# Patient Record
Sex: FEMALE | Employment: FULL TIME | URBAN - METROPOLITAN AREA
[De-identification: names, ages, dates, MRNs, and addresses within clinical notes are randomized per-mention and may not be internally consistent; named-entity substitution may affect disease eponyms.]

---

## 2020-08-05 ENCOUNTER — HOSPITAL ENCOUNTER (OUTPATIENT)
Dept: CT IMAGING | Age: 70
Discharge: HOME | End: 2020-08-05
Attending: UROLOGY
Payer: MEDICARE

## 2020-08-05 DIAGNOSIS — R31.29 MICROSCOPIC HEMATURIA: ICD-10-CM

## 2020-08-05 LAB — CREAT UR-MCNC: 0.7 MG/DL (ref 0.6–1.3)

## 2020-08-05 PROCEDURE — 82565 ASSAY OF CREATININE: CPT

## 2020-08-05 PROCEDURE — 74178 CT ABD&PLV WO CNTR FLWD CNTR: CPT

## 2020-08-05 PROCEDURE — 74011636320 HC RX REV CODE- 636/320: Performed by: UROLOGY

## 2020-08-05 RX ADMIN — IOPAMIDOL 100 ML: 755 INJECTION, SOLUTION INTRAVENOUS at 08:12

## 2020-08-05 NOTE — PROGRESS NOTES
Please send patient a copy of the report. Please call and let her know her CT scan was normal.     Midny Canada MD  [facility], [facility]

## 2020-10-03 PROBLEM — H25.012 CORTICAL AGE-RELATED CATARACT OF LEFT EYE: Status: ACTIVE | Noted: 2020-10-03

## 2020-10-05 PROBLEM — H25.012 CORTICAL AGE-RELATED CATARACT OF LEFT EYE: Status: RESOLVED | Noted: 2020-10-03 | Resolved: 2020-10-05

## 2020-10-18 PROBLEM — H25.011 CORTICAL AGE-RELATED CATARACT OF RIGHT EYE: Status: ACTIVE | Noted: 2020-10-18

## 2020-10-19 PROBLEM — H25.011 CORTICAL AGE-RELATED CATARACT OF RIGHT EYE: Status: RESOLVED | Noted: 2020-10-18 | Resolved: 2020-10-19

## 2020-11-12 ENCOUNTER — OFFICE VISIT (OUTPATIENT)
Dept: ORTHOPEDIC SURGERY | Age: 70
End: 2020-11-12
Payer: MEDICARE

## 2020-11-12 VITALS
HEIGHT: 63 IN | SYSTOLIC BLOOD PRESSURE: 101 MMHG | OXYGEN SATURATION: 96 % | TEMPERATURE: 96.9 F | BODY MASS INDEX: 38.13 KG/M2 | DIASTOLIC BLOOD PRESSURE: 61 MMHG | HEART RATE: 71 BPM | WEIGHT: 215.2 LBS

## 2020-11-12 DIAGNOSIS — M25.561 CHRONIC PAIN OF RIGHT KNEE: Primary | ICD-10-CM

## 2020-11-12 DIAGNOSIS — E66.01 SEVERE OBESITY (HCC): ICD-10-CM

## 2020-11-12 DIAGNOSIS — G89.29 CHRONIC PAIN OF RIGHT KNEE: Primary | ICD-10-CM

## 2020-11-12 PROCEDURE — G8419 CALC BMI OUT NRM PARAM NOF/U: HCPCS | Performed by: ORTHOPAEDIC SURGERY

## 2020-11-12 PROCEDURE — G8427 DOCREV CUR MEDS BY ELIG CLIN: HCPCS | Performed by: ORTHOPAEDIC SURGERY

## 2020-11-12 PROCEDURE — G9899 SCRN MAM PERF RSLTS DOC: HCPCS | Performed by: ORTHOPAEDIC SURGERY

## 2020-11-12 PROCEDURE — G8510 SCR DEP NEG, NO PLAN REQD: HCPCS | Performed by: ORTHOPAEDIC SURGERY

## 2020-11-12 PROCEDURE — G8536 NO DOC ELDER MAL SCRN: HCPCS | Performed by: ORTHOPAEDIC SURGERY

## 2020-11-12 PROCEDURE — 3017F COLORECTAL CA SCREEN DOC REV: CPT | Performed by: ORTHOPAEDIC SURGERY

## 2020-11-12 PROCEDURE — 73564 X-RAY EXAM KNEE 4 OR MORE: CPT | Performed by: ORTHOPAEDIC SURGERY

## 2020-11-12 PROCEDURE — 1101F PT FALLS ASSESS-DOCD LE1/YR: CPT | Performed by: ORTHOPAEDIC SURGERY

## 2020-11-12 PROCEDURE — 1090F PRES/ABSN URINE INCON ASSESS: CPT | Performed by: ORTHOPAEDIC SURGERY

## 2020-11-12 PROCEDURE — 99204 OFFICE O/P NEW MOD 45 MIN: CPT | Performed by: ORTHOPAEDIC SURGERY

## 2020-11-12 PROCEDURE — G8400 PT W/DXA NO RESULTS DOC: HCPCS | Performed by: ORTHOPAEDIC SURGERY

## 2020-11-12 NOTE — PROGRESS NOTES
Patient: Quinn Mccormack                MRN: [id number]       SSN: [id number]  YOB: 1950        AGE: 72 y.o. SEX: female  Body mass index is 38.12 kg/m². PCP: Carlin Padilla MD  11/12/20  Dear Dr. Marc German:    HISTORY: Thank you for having us see Ms. Adarsh Dodge for opinion and advice in regards to her painful right knee. It has been going on for an unspecified period of time, but definitely worse in the last few months and she reports difficulties going up and down stairs. She avoids kneeling also and also putting shoes and socks on. Denies much in the way of groin or back pain. 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 It does bother her to get up. She denies much in the way of swelling, catching, locking or giving way. She points to the lateral aspect of the knee, but it also can hurt anteriorly and posteriorly in fact the whole knee can be moderately painful. She has tried cortisone in the past, which did not help her. She does get some night pain associated with it. Some days she will have an antalgic gait associated with it. She denies fevers or chills and otherwise is feeling well. She denies morning stiffness. PHYSICAL EXAMINATION:  She is examined with a female assistant present. She has a touch of a movie theater sign when she first ambulates, which tends to smooth off, only a minimally antalgic component to the gait, relatively smooth pattern. The hips rotate nicely. Both feet are warm and well perfused. She actually has good calf musculature distally. Borderline positive J sign. Lateral patella is only mildly tender and she has medial joint line tenderness, not so much on the lateral side, and Thelma's test is tender, but non clicking. Extensors are intact. She has full motion. Calf non tender. Corona Narrow' sign negative. The hip actually rotates well.     RADIOGRAPHS:  X-rays today on 11/12/20, AP, tunnel, lateral and skyline, reveal moderate arthritis of the right knee. PLAN:  I am suspicious of meniscal pathology since she certainly did not have any result with the injection, which is a touch surprising. I do think she has moderate patellofemoral disease as well. Eventually knee replacement. I think it is too soon for this at this point. I would like to get an MRI with better information, especially since she has failed nonoperative management already and we will see her back after MRI of right knee. Anthony Walter MD          REVIEW OF SYSTEMS:      CON: negative  EYE: negative   ENT: negative  RESP: negative  GI:    negative   :  negative  MSK: Positive  A twelve point review of systems was completed, positives noted and all other systems were reviewed and are negative          Past Medical History:   Diagnosis Date    Asthma     hx in the past, not using inhalers    Blood in urine     Carotid artery disease ([facility] [address]     Cortical age-related cataract of left eye 10/3/2020    Cortical age-related cataract of right eye 10/18/2020    GERD (gastroesophageal reflux disease)     PCOS (polycystic ovarian syndrome)     pt. states \"in the past\"    Restless leg syndrome     Vitamin D deficiency        Family History   Adopted: Yes       Current Outpatient Medications   Medication Sig Dispense Refill    diphenhydrAMINE (BenadryL) 25 mg capsule Take 25 mg by mouth every six (6) hours as needed.  acetaminophen (TYLENOL) 500 mg tablet Take 500 mg by mouth every four (4) hours as needed.  aspirin delayed-release 81 mg tablet Take 81 mg by mouth daily.  cholecalciferol (VITAMIN D3) (50,000 UNITS /1250 MCG) capsule Take  by mouth.  cyanocobalamin, vitamin B-12, 5,000 mcg subl by SubLINGual route.  esomeprazole (NEXIUM) 20 mg capsule Take 20 mg by mouth three (3) times daily.  multivitamin (ONE A DAY) tablet Take 1 Tab by mouth daily.       sulindac (CLINORIL) 200 mg tablet Take 200 mg by mouth as needed.          Allergies   Allergen Reactions    Chlorpheniramine-Phenylpropan Other (comments)     Denies       Past Surgical History:   Procedure Laterality Date    CYSTOSCOPY  9/23/2020         HX CATARACT REMOVAL Left 10/05/2020    HX GASTRIC BYPASS  08/31/1998    HX HERNIA REPAIR  1999    HX PARTIAL HYSTERECTOMY  1980    HX TONSILLECTOMY         Social History     Socioeconomic History    Marital status: SINGLE     Spouse name: Not on file    Number of children: Not on file    Years of education: Not on file    Highest education level: Not on file   Occupational History    Not on file   Social Needs    Financial resource strain: Not on file    Food insecurity     Worry: Not on file     Inability: Not on file    Transportation needs     Medical: Not on file     Non-medical: Not on file   Tobacco Use    Smoking status: Never Smoker    Smokeless tobacco: Never Used   Substance and Sexual Activity    Alcohol use: Never     Frequency: Never    Drug use: Never    Sexual activity: Not on file   Lifestyle    Physical activity     Days per week: Not on file     Minutes per session: Not on file    Stress: Not on file   Relationships    Social connections     Talks on phone: Not on file     Gets together: Not on file     Attends Shinto service: Not on file     Active member of club or organization: Not on file     Attends meetings of clubs or organizations: Not on file     Relationship status: Not on file    Intimate partner violence     Fear of current or ex partner: Not on file     Emotionally abused: Not on file     Physically abused: Not on file     Forced sexual activity: Not on file   Other Topics Concern    Not on file   Social History Narrative    Not on file       Visit Vitals  /61 (BP 1 Location: Left arm, BP Patient Position: Sitting)   Pulse 71   Temp 96.9 °F (36.1 °C) (Temporal)   Ht 5' 3\" (1.6 m)   Wt 97.6 kg (215 lb 3.2 oz)   SpO2 96%   BMI 38.12 kg/m² PHYSICAL EXAMINATION:  GENERAL: Alert and oriented x3, in no acute distress, well-developed, well-nourished, afebrile. HEART: No JVD. EYES: No scleral icterus   NECK: No significant lymphadenopathy   LUNGS: No respiratory compromise or indrawing  ABDOMEN: Soft, non-tender, non-distended. Electronically signed by:  Disha Lora MD

## 2020-11-19 DIAGNOSIS — M25.561 CHRONIC PAIN OF RIGHT KNEE: ICD-10-CM

## 2020-11-19 DIAGNOSIS — G89.29 CHRONIC PAIN OF RIGHT KNEE: ICD-10-CM

## 2022-03-19 PROBLEM — E66.01 SEVERE OBESITY (HCC): Status: ACTIVE | Noted: 2020-11-12

## 2023-08-16 ENCOUNTER — HOSPITAL ENCOUNTER (OUTPATIENT)
Facility: HOSPITAL | Age: 73
Discharge: HOME | End: 2023-08-19
Attending: FAMILY MEDICINE
Payer: MEDICARE

## 2023-08-16 DIAGNOSIS — Z13.820 OSTEOPOROSIS SCREENING: ICD-10-CM

## 2023-08-16 DIAGNOSIS — Z78.0 MENOPAUSE: ICD-10-CM

## 2023-08-16 PROCEDURE — 77080 DXA BONE DENSITY AXIAL: CPT
